# Patient Record
Sex: MALE | URBAN - METROPOLITAN AREA
[De-identification: names, ages, dates, MRNs, and addresses within clinical notes are randomized per-mention and may not be internally consistent; named-entity substitution may affect disease eponyms.]

---

## 2023-11-13 ENCOUNTER — APPOINTMENT (RX ONLY)
Dept: URBAN - METROPOLITAN AREA CLINIC 176 | Facility: CLINIC | Age: 43
Setting detail: DERMATOLOGY
End: 2023-11-13

## 2023-11-13 VITALS — HEIGHT: 70 IN | WEIGHT: 160 LBS

## 2023-11-13 DIAGNOSIS — L81.4 OTHER MELANIN HYPERPIGMENTATION: ICD-10-CM

## 2023-11-13 DIAGNOSIS — L57.8 OTHER SKIN CHANGES DUE TO CHRONIC EXPOSURE TO NONIONIZING RADIATION: ICD-10-CM

## 2023-11-13 DIAGNOSIS — Z71.89 OTHER SPECIFIED COUNSELING: ICD-10-CM

## 2023-11-13 DIAGNOSIS — D22 MELANOCYTIC NEVI: ICD-10-CM

## 2023-11-13 DIAGNOSIS — L71.0 PERIORAL DERMATITIS: ICD-10-CM

## 2023-11-13 PROBLEM — D22.39 MELANOCYTIC NEVI OF OTHER PARTS OF FACE: Status: ACTIVE | Noted: 2023-11-13

## 2023-11-13 PROCEDURE — ? COUNSELING

## 2023-11-13 PROCEDURE — ? PRESCRIPTION

## 2023-11-13 PROCEDURE — 99204 OFFICE O/P NEW MOD 45 MIN: CPT

## 2023-11-13 RX ORDER — TACROLIMUS 1 MG/G
OINTMENT TOPICAL
Qty: 30 | Refills: 1 | Status: ERX | COMMUNITY
Start: 2023-11-13

## 2023-11-13 RX ADMIN — TACROLIMUS: 1 OINTMENT TOPICAL at 00:00

## 2023-11-13 ASSESSMENT — LOCATION DETAILED DESCRIPTION DERM
LOCATION DETAILED: LEFT SUPERIOR CENTRAL MALAR CHEEK
LOCATION DETAILED: LEFT INFERIOR MEDIAL FOREHEAD
LOCATION DETAILED: RIGHT SUPERIOR CENTRAL MALAR CHEEK
LOCATION DETAILED: RIGHT LATERAL MALAR CHEEK

## 2023-11-13 ASSESSMENT — LOCATION SIMPLE DESCRIPTION DERM
LOCATION SIMPLE: RIGHT CHEEK
LOCATION SIMPLE: LEFT FOREHEAD
LOCATION SIMPLE: LEFT CHEEK

## 2023-11-13 ASSESSMENT — LOCATION ZONE DERM: LOCATION ZONE: FACE

## 2023-11-13 NOTE — PROCEDURE: MIPS QUALITY
Claudina Rinne PA-C assessed wound at this time. The wound was cleansed, debrided of foreign material as much as possible, and dressed. The patient is alerted to watch for any signs of infection (redness, pus, pain, increased swelling or fever) and call if such occurs.
Quality 110: Preventive Care And Screening: Influenza Immunization: Influenza Immunization not Administered because Patient Refused.
Detail Level: Generalized
Quality 130: Documentation Of Current Medications In The Medical Record: Current Medications Documented